# Patient Record
Sex: MALE | NOT HISPANIC OR LATINO | Employment: UNEMPLOYED | URBAN - METROPOLITAN AREA
[De-identification: names, ages, dates, MRNs, and addresses within clinical notes are randomized per-mention and may not be internally consistent; named-entity substitution may affect disease eponyms.]

---

## 2025-02-26 ENCOUNTER — APPOINTMENT (OUTPATIENT)
Dept: RADIOLOGY | Facility: HOSPITAL | Age: 40
End: 2025-02-26
Payer: COMMERCIAL

## 2025-02-26 ENCOUNTER — HOSPITAL ENCOUNTER (EMERGENCY)
Facility: HOSPITAL | Age: 40
Discharge: HOME | End: 2025-02-27
Attending: EMERGENCY MEDICINE
Payer: COMMERCIAL

## 2025-02-26 ENCOUNTER — APPOINTMENT (OUTPATIENT)
Dept: CARDIOLOGY | Facility: HOSPITAL | Age: 40
End: 2025-02-26
Payer: COMMERCIAL

## 2025-02-26 VITALS
SYSTOLIC BLOOD PRESSURE: 168 MMHG | HEIGHT: 71 IN | DIASTOLIC BLOOD PRESSURE: 89 MMHG | TEMPERATURE: 97.3 F | OXYGEN SATURATION: 96 % | WEIGHT: 277 LBS | BODY MASS INDEX: 38.78 KG/M2 | RESPIRATION RATE: 15 BRPM | HEART RATE: 83 BPM

## 2025-02-26 DIAGNOSIS — R06.02 SHORTNESS OF BREATH: Primary | ICD-10-CM

## 2025-02-26 LAB
ALBUMIN SERPL BCP-MCNC: 4.7 G/DL (ref 3.4–5)
ALP SERPL-CCNC: 103 U/L (ref 33–120)
ALT SERPL W P-5'-P-CCNC: 32 U/L (ref 10–52)
ANION GAP SERPL CALC-SCNC: 10 MMOL/L (ref 10–20)
AST SERPL W P-5'-P-CCNC: 31 U/L (ref 9–39)
BILIRUB DIRECT SERPL-MCNC: 0 MG/DL (ref 0–0.3)
BILIRUB SERPL-MCNC: 0.4 MG/DL (ref 0–1.2)
BNP SERPL-MCNC: 17 PG/ML (ref 0–99)
BUN SERPL-MCNC: 13 MG/DL (ref 6–23)
CALCIUM SERPL-MCNC: 9 MG/DL (ref 8.6–10.3)
CARDIAC TROPONIN I PNL SERPL HS: 3 NG/L (ref 0–20)
CHLORIDE SERPL-SCNC: 98 MMOL/L (ref 98–107)
CO2 SERPL-SCNC: 30 MMOL/L (ref 21–32)
CREAT SERPL-MCNC: 1.08 MG/DL (ref 0.5–1.3)
EGFRCR SERPLBLD CKD-EPI 2021: 90 ML/MIN/1.73M*2
FLUAV RNA RESP QL NAA+PROBE: NOT DETECTED
FLUBV RNA RESP QL NAA+PROBE: NOT DETECTED
GLUCOSE SERPL-MCNC: 125 MG/DL (ref 74–99)
INR PPP: 0.9 (ref 0.9–1.1)
POTASSIUM SERPL-SCNC: 3.9 MMOL/L (ref 3.5–5.3)
PROT SERPL-MCNC: 7.7 G/DL (ref 6.4–8.2)
PROTHROMBIN TIME: 10.4 SECONDS (ref 9.8–12.4)
SARS-COV-2 RNA RESP QL NAA+PROBE: NOT DETECTED
SODIUM SERPL-SCNC: 134 MMOL/L (ref 136–145)

## 2025-02-26 PROCEDURE — 80053 COMPREHEN METABOLIC PANEL: CPT | Performed by: PHYSICIAN ASSISTANT

## 2025-02-26 PROCEDURE — 99285 EMERGENCY DEPT VISIT HI MDM: CPT | Mod: 25 | Performed by: EMERGENCY MEDICINE

## 2025-02-26 PROCEDURE — 93970 EXTREMITY STUDY: CPT | Performed by: STUDENT IN AN ORGANIZED HEALTH CARE EDUCATION/TRAINING PROGRAM

## 2025-02-26 PROCEDURE — 85610 PROTHROMBIN TIME: CPT | Performed by: PHYSICIAN ASSISTANT

## 2025-02-26 PROCEDURE — 71046 X-RAY EXAM CHEST 2 VIEWS: CPT | Performed by: RADIOLOGY

## 2025-02-26 PROCEDURE — 93970 EXTREMITY STUDY: CPT

## 2025-02-26 PROCEDURE — 83880 ASSAY OF NATRIURETIC PEPTIDE: CPT | Performed by: PHYSICIAN ASSISTANT

## 2025-02-26 PROCEDURE — 87636 SARSCOV2 & INF A&B AMP PRB: CPT | Performed by: PHYSICIAN ASSISTANT

## 2025-02-26 PROCEDURE — 36415 COLL VENOUS BLD VENIPUNCTURE: CPT | Performed by: PHYSICIAN ASSISTANT

## 2025-02-26 PROCEDURE — 84484 ASSAY OF TROPONIN QUANT: CPT | Performed by: PHYSICIAN ASSISTANT

## 2025-02-26 PROCEDURE — 71046 X-RAY EXAM CHEST 2 VIEWS: CPT

## 2025-02-26 PROCEDURE — 93005 ELECTROCARDIOGRAM TRACING: CPT

## 2025-02-26 ASSESSMENT — PAIN SCALES - GENERAL: PAINLEVEL_OUTOF10: 6

## 2025-02-26 ASSESSMENT — PAIN DESCRIPTION - LOCATION: LOCATION: BACK

## 2025-02-26 ASSESSMENT — PAIN - FUNCTIONAL ASSESSMENT: PAIN_FUNCTIONAL_ASSESSMENT: 0-10

## 2025-02-26 ASSESSMENT — COLUMBIA-SUICIDE SEVERITY RATING SCALE - C-SSRS
6. HAVE YOU EVER DONE ANYTHING, STARTED TO DO ANYTHING, OR PREPARED TO DO ANYTHING TO END YOUR LIFE?: NO
1. IN THE PAST MONTH, HAVE YOU WISHED YOU WERE DEAD OR WISHED YOU COULD GO TO SLEEP AND NOT WAKE UP?: NO
2. HAVE YOU ACTUALLY HAD ANY THOUGHTS OF KILLING YOURSELF?: NO

## 2025-02-26 ASSESSMENT — PAIN DESCRIPTION - DESCRIPTORS: DESCRIPTORS: ACHING

## 2025-02-26 ASSESSMENT — PAIN DESCRIPTION - FREQUENCY: FREQUENCY: CONSTANT/CONTINUOUS

## 2025-02-26 ASSESSMENT — PAIN DESCRIPTION - PAIN TYPE: TYPE: ACUTE PAIN

## 2025-02-26 NOTE — ED TRIAGE NOTES
The patient was seen and examined in triage.    History of Present Illness: The patient is a 40-year-old male presents emergency department due to leg edema for the last 2 to 3 weeks.  He reports that he swelling all the way up his knees.  He reports that he also noticed he started getting short of breath over the last few days.  He reports that he is having a hard time doing daily activities due to shortness of breath.  He denies any chest pains.  He denies any fever or chills.  He denies cough or congestion.  He does smoke daily.  He denies any illicit drug usage or daily alcohol usage.  He reports significant cardiac family history.    Brief Physical Exam:  Exam is limited by the patient sitting in a chair in triage.   Heart: Regular rate and rhythm.  Pitting edema to bilateral lower extremities.  Lungs: Clear to auscultation bilaterally.   Abdomen: Soft, nondistended, nontender     Plan: Appropriate labs and diagnostic imaging were ordered.      For the remainder of the patient's workup and ED course, please refer to the main ED provider note. We discussed need for diagnostic testing including laboratory studies and imaging.  We also discussed that they may be asked to wait in the waiting room while these tests are pending.  They understand that if they choose to leave without having the testing completed or resulted that we cannot rule out acute life threatening illnesses and the risks involved could lead to worsening condition, permanent disability or even death.      Disclaimer: This note was dictated by speech recognition. Minor errors in transcription may be present. Please call if questions.

## 2025-02-26 NOTE — ED TRIAGE NOTES
States that he has been SOB for the past couple of weeks. Pt is a current smoker. States that it is difficult to move around due to SOB.     Has noticed that his legs are swelling in the past couple of weeks. Bilateral legs are swollen and red with pitting edema. Denies any cardiac hx

## 2025-02-27 NOTE — ED PROVIDER NOTES
HPI   Chief Complaint   Patient presents with    Shortness of Breath     States that he has been SOB for the past couple of weeks. Pt is a current smoker. States that it is difficult to move around due to SOB.     Leg Swelling     Has noticed that his legs are swelling in the past couple of weeks. Bilateral legs are swollen and red with pitting edema. Denies any cardiac hx       40-year-old male with history of 3 weeks of shortness of breath.  He is a smoker.  Denies history of COPD or asthma.  Patient states it has been difficult walking around recently.  He states he is rarely short of breath.  He denies chest pain.  No fevers or chills.  No cough.  He does not feel unwell.  He does note that his legs are swollen bilaterally.  No history of DVT.  Patient does admit to his 60 pound weight gain over the last 6 months              Patient History   No past medical history on file.  Past Surgical History:   Procedure Laterality Date    APPENDECTOMY  06/23/2016    Appendectomy    TONSILLECTOMY  06/23/2016    Tonsillectomy     No family history on file.  Social History     Tobacco Use    Smoking status: Not on file    Smokeless tobacco: Not on file   Substance Use Topics    Alcohol use: Not on file    Drug use: Not on file       Physical Exam   ED Triage Vitals [02/26/25 1705]   Temperature Heart Rate Respirations BP   36.3 °C (97.3 °F) 98 18 151/86      Pulse Ox Temp Source Heart Rate Source Patient Position   98 % Temporal -- Sitting      BP Location FiO2 (%)     Right arm --       Physical Exam  Vitals and nursing note reviewed.   Constitutional:       General: He is not in acute distress.  HENT:      Head: Normocephalic and atraumatic.   Eyes:      Conjunctiva/sclera: Conjunctivae normal.   Cardiovascular:      Rate and Rhythm: Normal rate and regular rhythm.      Heart sounds: No murmur heard.  Pulmonary:      Effort: Pulmonary effort is normal. No respiratory distress.      Breath sounds: Normal breath sounds.    Abdominal:      Palpations: Abdomen is soft.   Musculoskeletal:         General: No swelling.      Cervical back: Normal range of motion and neck supple.      Right lower le+ Pitting Edema present.      Left lower le+ Pitting Edema present.      Comments: No cords palpated and compartments are soft bilaterally.  Compartments are soft.   Skin:     General: Skin is warm and dry.      Capillary Refill: Capillary refill takes less than 2 seconds.   Neurological:      Mental Status: He is alert.   Psychiatric:         Mood and Affect: Mood normal.           ED Course & MDM   Diagnoses as of 25 0058   Shortness of breath                 No data recorded     Nelson Coma Scale Score: 15 (25 2224 : Bekah Landaverde RN)                           Medical Decision Making  Patient presenting with shortness of breath.  Denies chest pain.  States has been short of breath for about 3 weeks or so.  Patient had lab work started in the waiting room due to heavy ED volumes.  Influenza A and COVID were negative.High-sensitivity troponin was 3.  EKG interpreted by myself shows a sinus rhythm with a ventricular rate of 85 bpm without sign of ischemic change or dysrhythmia.  No STEMI.  Chest x-ray interpreted by myself shows no acute cardiopulmonary process.  Radiologist interprets this and agrees.  Patient had lower extremity DVT studies bilaterally which were also negative.PT/INR unremarkable.  Renal function and electrolytes unremarkable with exception of a sodium of 134.  LFTs are normal.  BNP 17.  Patient's vital signs are stable he is afebrile.  He is not hypoxic on room air.  He does admit to a 60 pound weight gain which would could be contributing to his shortness of breath.  I did give him a referral for primary care as he states he does not want to go to [facility] anymore.  I think he is amenable for outpatient follow-up and return precautions were discussed        Procedure  Procedures     Kike RUFF  Juaquin, DO  02/27/25 0103       Kike Reza, DO  02/27/25 0154

## 2025-02-28 LAB
ATRIAL RATE: 85 BPM
P AXIS: 78 DEGREES
P OFFSET: 207 MS
P ONSET: 153 MS
PR INTERVAL: 136 MS
Q ONSET: 221 MS
QRS COUNT: 14 BEATS
QRS DURATION: 98 MS
QT INTERVAL: 386 MS
QTC CALCULATION(BAZETT): 459 MS
QTC FREDERICIA: 433 MS
R AXIS: 48 DEGREES
T AXIS: 60 DEGREES
T OFFSET: 414 MS
VENTRICULAR RATE: 85 BPM